# Patient Record
Sex: FEMALE | URBAN - METROPOLITAN AREA
[De-identification: names, ages, dates, MRNs, and addresses within clinical notes are randomized per-mention and may not be internally consistent; named-entity substitution may affect disease eponyms.]

---

## 2024-09-24 ENCOUNTER — APPOINTMENT (OUTPATIENT)
Age: 49
Setting detail: DERMATOLOGY
End: 2024-09-24

## 2024-09-24 DIAGNOSIS — L72.8 OTHER FOLLICULAR CYSTS OF THE SKIN AND SUBCUTANEOUS TISSUE: ICD-10-CM

## 2024-09-24 PROCEDURE — OTHER COUNSELING: OTHER

## 2024-09-24 PROCEDURE — OTHER INTRALESIONAL KENALOG: OTHER

## 2024-09-24 PROCEDURE — 11900 INJECT SKIN LESIONS </W 7: CPT

## 2024-09-24 ASSESSMENT — LOCATION ZONE DERM
LOCATION ZONE: ARM
LOCATION ZONE: LEG

## 2024-09-24 ASSESSMENT — LOCATION SIMPLE DESCRIPTION DERM
LOCATION SIMPLE: LEFT THIGH
LOCATION SIMPLE: LEFT UPPER ARM

## 2024-09-24 ASSESSMENT — LOCATION DETAILED DESCRIPTION DERM
LOCATION DETAILED: LEFT ANTERIOR MEDIAL PROXIMAL UPPER ARM
LOCATION DETAILED: LEFT ANTERIOR PROXIMAL THIGH

## 2024-09-24 NOTE — PROCEDURE: INTRALESIONAL KENALOG
X Size Of Lesion In Cm (Optional): 0
Bill For Wasted Drug (Kenalog)?: no
Show Inventory Tab: Hide
Concentration Of Kenalog Solution Injected (Mg/Ml): 5.0
Kenalog Preparation: Kenalog
Validate Note Data When Using Inventory: Yes
Administered By (Optional): Gagandeep Bettencourt
Detail Level: Detailed
Total Volume (Ccs): 0.3
Medical Necessity Clause: This procedure was medically necessary because the lesions that were treated were:
Expiration Date For Kenalog (Optional): 2/2026
Consent: The risks of atrophy were reviewed with the patient.
Lot # For Kenalog (Optional): [id number]
Kenalog Type Of Vial: Multiple Dose

## 2024-09-24 NOTE — HPI: SKIN LESION
Is This A New Presentation, Or A Follow-Up?: Skin Lesion
Additional History: Lesions present for a couple of months.  The lesion on upper arm will burn and sting at night.  The lesion at groin groin area is asymptomatic.\\nNeither drain.\\nLesion on thigh has increased in size - not the lesion on her arm.\\nFirst evaluation of these lesions.\\nTried neosporin and warm compresses and nothing has resolved.